# Patient Record
Sex: MALE | Race: OTHER | NOT HISPANIC OR LATINO | URBAN - METROPOLITAN AREA
[De-identification: names, ages, dates, MRNs, and addresses within clinical notes are randomized per-mention and may not be internally consistent; named-entity substitution may affect disease eponyms.]

---

## 2023-06-06 ENCOUNTER — OFFICE VISIT (OUTPATIENT)
Dept: PEDIATRICS | Facility: CLINIC | Age: 18
End: 2023-06-06
Payer: COMMERCIAL

## 2023-06-06 VITALS
DIASTOLIC BLOOD PRESSURE: 68 MMHG | BODY MASS INDEX: 29.26 KG/M2 | SYSTOLIC BLOOD PRESSURE: 110 MMHG | WEIGHT: 216 LBS | HEIGHT: 72 IN

## 2023-06-06 DIAGNOSIS — Z00.129 ENCOUNTER FOR ROUTINE CHILD HEALTH EXAMINATION WITHOUT ABNORMAL FINDINGS: Primary | ICD-10-CM

## 2023-06-06 DIAGNOSIS — Z13.220 SCREENING FOR LIPID DISORDERS: ICD-10-CM

## 2023-06-06 DIAGNOSIS — Z13.31 DEPRESSION SCREEN: ICD-10-CM

## 2023-06-06 LAB
POC HDL CHOLESTEROL: 29 MG/DL (ref 0–40)
POC LDL CHOLESTEROL: 125 MG/DL (ref 0–100)
POC NON-HDL CHOLESTEROL: 139 MG/DL (ref 0–130)
POC TOTAL CHOLESTEROL/HDL RATIO: 5.8 (ref 0–4.5)
POC TOTAL CHOLESTEROL: 168 MG/DL (ref 0–199)
POC TRIGLYCERIDES: 73 MG/DL (ref 0–150)

## 2023-06-06 PROCEDURE — 96127 BRIEF EMOTIONAL/BEHAV ASSMT: CPT | Performed by: PEDIATRICS

## 2023-06-06 PROCEDURE — 80061 LIPID PANEL: CPT | Performed by: PEDIATRICS

## 2023-06-06 PROCEDURE — 99395 PREV VISIT EST AGE 18-39: CPT | Performed by: PEDIATRICS

## 2023-06-06 RX ORDER — PANTOPRAZOLE SODIUM 40 MG/1
40 TABLET, DELAYED RELEASE ORAL
COMMUNITY
End: 2024-04-04

## 2023-06-06 RX ORDER — CLONIDINE HYDROCHLORIDE 0.1 MG/1
TABLET ORAL
COMMUNITY

## 2023-06-06 RX ORDER — MIRTAZAPINE 15 MG/1
15 TABLET, FILM COATED ORAL NIGHTLY
COMMUNITY

## 2023-06-06 NOTE — PROGRESS NOTES
"Subjective   Patient ID: Simeon Kenny is a 17 y.o. male who presents for Well Child (Here with mom/VIS given for iutd/Vision:done today/Insurance:caresource/Forms:no/Smoke/vape:+ vape/).  HPI  Interval hx - no problems  Concerns today - yes, Light spots on trunk, present for a long time- tinea versicolor?  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 Protonix - sees GI  Psychiatry q 3 months  Diet - pretty good variety of foods, +dairy, water  Drinks 12 x sprite per day to stay hydrated (!!!) - suggested propel or other low fernie electrolyte drink  Exercise - work  [place] - normal  Sleep - 5-6 hrs - recommend 8 hrs  Denies smoking, vaping, alcohol, illicit drugs  Smokes marijuana 2-3 times/week. Says he will stop end of summer and he can if he wants. Does it with friends  Interested in   Safety - wears seatbelt always; doesn't ride bike    PHQ-A Depression screen: normal, score =  0    Objective   /68   Ht 1.835 m (6' 0.25\")   Wt 98 kg (216 lb)   BMI 29.09 kg/m²     Growth percentiles:   96 %ile (Z= 1.79) based on CDC (Boys, 2-20 Years) weight-for-age data using vitals from 6/6/2023.  83 %ile (Z= 0.95) based on CDC (Boys, 2-20 Years) Stature-for-age data based on Stature recorded on 6/6/2023.   93 %ile (Z= 1.46) based on CDC (Boys, 2-20 Years) BMI-for-age based on BMI available as of 6/6/2023.    Physical Exam  Vitals reviewed.   Constitutional:       General: He is not in acute distress.     Appearance: Normal appearance.   HENT:      Right Ear: Tympanic membrane normal.      Left Ear: Tympanic membrane normal.      Nose: Nose normal. No rhinorrhea.      Mouth/Throat:      Mouth: Mucous membranes are moist.      Pharynx: Oropharynx is clear. No posterior oropharyngeal erythema.   Eyes:      Extraocular Movements: Extraocular movements intact.      Conjunctiva/sclera: Conjunctivae normal.      Pupils: Pupils are " equal, round, and reactive to light.   Cardiovascular:      Rate and Rhythm: Normal rate and regular rhythm.      Heart sounds: Normal heart sounds.   Pulmonary:      Effort: Pulmonary effort is normal.      Breath sounds: Normal breath sounds.   Abdominal:      Palpations: Abdomen is soft. There is no mass.      Tenderness: There is no abdominal tenderness.   Genitourinary:     Penis: Normal.       Testes: Normal.      Comments: No hernia appreciated  Musculoskeletal:         General: Normal range of motion.      Cervical back: Normal range of motion and neck supple.   Lymphadenopathy:      Cervical: No cervical adenopathy.   Skin:     Comments: Hypopigmented patches scattered all over trunk/back   Neurological:      General: No focal deficit present.      Mental Status: He is alert.   Psychiatric:         Mood and Affect: Mood normal.       Recent Results (from the past 504 hour(s))   POCT Lipid Panel manually resulted    Collection Time: 06/06/23 10:39 AM   Result Value Ref Range    POC Total Cholesterol 168 0 - 199 mg/dl    POC HDL Cholesterol 29 0 - 40 mg/dl    POC Triglycerides 73 0 - 150 mg/dl    POC LDL Cholesterol 125 (A) 0 - 100 mg/dl    POC Non-HDL Cholesterol 139 (A) 0 - 130 mg/dl    POC Total Cholesterol/HDL Ratio  5.8 (A) 0 - 4.5     Assessment/Plan   Diagnoses and all orders for this visit:  Encounter for routine child health examination without abnormal findings  Simeon is doing well and has a normal physical exam today.  Well child handout for age given.  Discussed importance of healthy variety in diet, regular physical exercise, adequate sleep, appropriate safety restraints in car.   Follow up for next well visit in 1 year, or sooner with any concerns.  Depression screen  Screening for lipid disorders  -     POCT Lipid Panel manually resulted

## 2023-09-07 ENCOUNTER — OFFICE VISIT (OUTPATIENT)
Dept: PEDIATRICS | Facility: CLINIC | Age: 18
End: 2023-09-07
Payer: COMMERCIAL

## 2023-09-07 ENCOUNTER — TELEPHONE (OUTPATIENT)
Dept: PEDIATRICS | Facility: CLINIC | Age: 18
End: 2023-09-07
Payer: COMMERCIAL

## 2023-09-07 DIAGNOSIS — H10.33 ACUTE BACTERIAL CONJUNCTIVITIS OF BOTH EYES: Primary | ICD-10-CM

## 2023-09-07 DIAGNOSIS — Z01.00 VISION SCREEN WITHOUT ABNORMAL FINDINGS: ICD-10-CM

## 2023-09-07 PROCEDURE — 99173 VISUAL ACUITY SCREEN: CPT | Performed by: PEDIATRICS

## 2023-09-07 PROCEDURE — 99213 OFFICE O/P EST LOW 20 MIN: CPT | Performed by: PEDIATRICS

## 2023-09-07 RX ORDER — TOBRAMYCIN 3 MG/ML
SOLUTION/ DROPS OPHTHALMIC
Qty: 5 ML | Refills: 0 | Status: SHIPPED | OUTPATIENT
Start: 2023-09-07 | End: 2023-11-16 | Stop reason: ALTCHOICE

## 2023-09-07 NOTE — TELEPHONE ENCOUNTER
Spoke to mom and Simeon and mom thinks that he has pink eye and is asking if rx could be sent to the pharmacy.  Discussed w/mom and Simeon that he would need to be seen for a rx to be sent to the pharmacy.  Parent understands plan and has no other questions.   to schedule an apt.

## 2023-09-07 NOTE — PROGRESS NOTES
Here with Mom  Woke up this morning and rubbed his eye and since then it has gotten red and it is tearing and it feels like there is something stuck under the eyelid. His vision is normal. He is otherwise well There is no fever.  There is no runny nose or cough.  There is no sore throat no ear pain.  There is no vomiting or diarrhea.  There is no problems with urination.  Alert  PERRLA EOMI conjunctiva injected L>R no foreign object seen on lid eversion  No nasal discharge  Pharynx  no redness no exudate, membranes moist  TM clear  No cervical lymphadenopathy  RRR  CTA  No rash  Vision Screening    Right eye Left eye Both eyes   Without correction 20/20 20/20    With correction          Permission obtained from patient and Mom  Fluorescein stain applied to left eye  No abrasion seen when using the lamp  Eye irrigated with sterile water afterward  Patient tolerated well    1. Acute bacterial conjunctivitis of both eyes  tobramycin (Tobrex) 0.3 % ophthalmic solution      2. Vision screen without abnormal findings [Z01.00]        Simeon has been diagnosed with pink eye He will be given an antibiotic to treat this. He will be considered not contagious 24 hours after the antibiotic is started Call if he is not better in the next 2-3 days  Return as needed

## 2023-10-20 PROBLEM — F12.11 HISTORY OF CANNABIS ABUSE: Status: ACTIVE | Noted: 2023-10-20

## 2023-10-20 PROBLEM — R10.31 RIGHT INGUINAL PAIN: Status: ACTIVE | Noted: 2023-10-20

## 2023-10-20 PROBLEM — R10.9 ABDOMINAL PAIN: Status: ACTIVE | Noted: 2023-10-20

## 2023-10-20 PROBLEM — K59.09 CHRONIC CONSTIPATION: Status: ACTIVE | Noted: 2023-10-20

## 2023-10-20 PROBLEM — R11.0 NAUSEA IN CHILD: Status: ACTIVE | Noted: 2023-10-20

## 2023-10-20 PROBLEM — G44.319 ACUTE POST-TRAUMATIC HEADACHE, NOT INTRACTABLE: Status: ACTIVE | Noted: 2023-10-20

## 2023-10-20 PROBLEM — F45.21 HYPOCHONDRIASIS: Status: ACTIVE | Noted: 2021-05-28

## 2023-10-20 PROBLEM — S09.90XA INJURY OF HEAD: Status: ACTIVE | Noted: 2023-10-20

## 2023-10-20 PROBLEM — E55.9 VITAMIN D DEFICIENCY: Status: ACTIVE | Noted: 2023-10-20

## 2023-10-20 PROBLEM — R51.9 ACUTE INTRACTABLE HEADACHE: Status: ACTIVE | Noted: 2023-10-20

## 2023-10-20 PROBLEM — F84.0 AUTISTIC DISORDER (HHS-HCC): Status: ACTIVE | Noted: 2021-05-28

## 2023-10-20 PROBLEM — T24.009A: Status: ACTIVE | Noted: 2023-10-20

## 2023-10-20 PROBLEM — F41.0 PANIC ATTACK: Status: ACTIVE | Noted: 2023-10-20

## 2023-10-20 PROBLEM — Z86.16 HISTORY OF COVID-19: Status: ACTIVE | Noted: 2023-10-20

## 2023-10-20 PROBLEM — S00.03XA CONTUSION OF SCALP: Status: ACTIVE | Noted: 2023-10-20

## 2023-10-20 PROBLEM — F40.01 AGORAPHOBIA WITH PANIC DISORDER: Status: ACTIVE | Noted: 2021-05-28

## 2023-10-20 PROBLEM — L03.031 ACUTE PARONYCHIA OF TOE OF RIGHT FOOT: Status: ACTIVE | Noted: 2023-10-20

## 2023-10-20 PROBLEM — F90.2 ATTENTION DEFICIT HYPERACTIVITY DISORDER (ADHD), COMBINED TYPE: Status: ACTIVE | Noted: 2023-10-20

## 2023-10-20 PROBLEM — H53.149 PHOTOPHOBIA: Status: ACTIVE | Noted: 2023-10-20

## 2023-10-20 PROBLEM — K21.9 GERD WITHOUT ESOPHAGITIS: Status: ACTIVE | Noted: 2023-10-20

## 2023-10-20 PROBLEM — L60.0 ONYCHOCRYPTOSIS: Status: ACTIVE | Noted: 2023-10-20

## 2023-10-20 PROBLEM — L23.9 ALLERGIC CONTACT DERMATITIS: Status: ACTIVE | Noted: 2023-10-20

## 2023-10-20 PROBLEM — S93.432A SPRAIN OF TIBIOFIBULAR LIGAMENT OF LEFT ANKLE: Status: ACTIVE | Noted: 2023-10-20

## 2023-10-20 PROBLEM — R12 HEARTBURN: Status: ACTIVE | Noted: 2023-10-20
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
## 2023-10-20 PROBLEM — S62.621A FRACTURE OF MIDDLE PHALANX OF LEFT INDEX FINGER: Status: ACTIVE | Noted: 2023-10-20

## 2023-10-20 PROBLEM — F40.298 PHONOPHOBIA: Status: ACTIVE | Noted: 2023-10-20

## 2023-10-20 PROBLEM — S69.90XA INJURY OF FINGER: Status: ACTIVE | Noted: 2023-10-20

## 2023-10-20 PROBLEM — S06.0X0A CONCUSSION WITH NO LOSS OF CONSCIOUSNESS: Status: ACTIVE | Noted: 2023-10-20

## 2023-10-20 PROBLEM — S99.919A INJURY OF ANKLE: Status: ACTIVE | Noted: 2023-10-20

## 2023-10-20 PROBLEM — G47.00 INSOMNIA, UNSPECIFIED: Status: ACTIVE | Noted: 2021-05-28

## 2023-10-20 PROBLEM — F41.9 ANXIETY: Status: ACTIVE | Noted: 2023-10-20

## 2023-10-20 PROBLEM — J02.9 SORE THROAT: Status: ACTIVE | Noted: 2023-10-20

## 2023-10-20 RX ORDER — SUCRALFATE 1 G/1
TABLET ORAL
COMMUNITY
End: 2023-11-16 | Stop reason: ALTCHOICE

## 2023-10-20 RX ORDER — DOCUSATE SODIUM 100 MG/1
100-400 CAPSULE, LIQUID FILLED ORAL DAILY PRN
COMMUNITY
End: 2023-11-16 | Stop reason: ALTCHOICE

## 2023-10-20 RX ORDER — LEVOCETIRIZINE DIHYDROCHLORIDE 5 MG/1
TABLET, FILM COATED ORAL
COMMUNITY
End: 2023-11-16 | Stop reason: ALTCHOICE

## 2023-11-11 ENCOUNTER — TELEPHONE (OUTPATIENT)
Dept: PEDIATRICS | Facility: CLINIC | Age: 18
End: 2023-11-11
Payer: COMMERCIAL

## 2023-11-11 NOTE — TELEPHONE ENCOUNTER
Mom informed that Dr. Fried typically does not prescribe Zofran in an outpatient setting.  Offered appt, but she declined.

## 2023-11-16 ENCOUNTER — OFFICE VISIT (OUTPATIENT)
Dept: PEDIATRICS | Facility: CLINIC | Age: 18
End: 2023-11-16
Payer: COMMERCIAL

## 2023-11-16 VITALS
HEIGHT: 72 IN | HEART RATE: 82 BPM | DIASTOLIC BLOOD PRESSURE: 78 MMHG | SYSTOLIC BLOOD PRESSURE: 120 MMHG | BODY MASS INDEX: 26.14 KG/M2 | WEIGHT: 193 LBS | OXYGEN SATURATION: 98 %

## 2023-11-16 DIAGNOSIS — F41.1 GENERALIZED ANXIETY DISORDER: Primary | ICD-10-CM

## 2023-11-16 PROCEDURE — 99213 OFFICE O/P EST LOW 20 MIN: CPT | Performed by: PEDIATRICS

## 2023-11-16 RX ORDER — ONDANSETRON 4 MG/1
TABLET, FILM COATED ORAL
COMMUNITY
Start: 2023-11-14 | End: 2024-02-22 | Stop reason: ALTCHOICE

## 2023-11-16 RX ORDER — BUSPIRONE HYDROCHLORIDE 10 MG/1
10 TABLET ORAL 2 TIMES DAILY
COMMUNITY
Start: 2023-11-14 | End: 2024-02-22 | Stop reason: ALTCHOICE

## 2023-11-16 ASSESSMENT — ENCOUNTER SYMPTOMS
CONSTIPATION: 0
SHORTNESS OF BREATH: 1
NUMBNESS: 0
COUGH: 0
HEADACHES: 1
DIARRHEA: 0
ABDOMINAL DISTENTION: 0
CHILLS: 0
MYALGIAS: 0
NAUSEA: 1
DIZZINESS: 1
FATIGUE: 0
ACTIVITY CHANGE: 0
WHEEZING: 0
VOMITING: 1
WEAKNESS: 0
LIGHT-HEADEDNESS: 0
APPETITE CHANGE: 0
BACK PAIN: 1
STRIDOR: 0
FEVER: 0
NERVOUS/ANXIOUS: 1

## 2023-11-16 NOTE — PROGRESS NOTES
Subjective   Patient ID: Simeon Kenny is a 18 y.o. male here with mom.     HPI:  18 year old male here with with increased anxiety. Patient has anxiety about taking Buspar, mirtazapine and clonidine together. He had recently been seen at [facility] for an emergency nurse visit and that nurse communicated to a psychiatrist about patient's symptoms and the psychiatrist recommended adding Buspar to his medication regimen. Patient is worried that taking these medications all together will decrease his blood pressure so he has not taken the Buspar yet. Patient can't get in touch with his psychiatrist regarding his anxiety and concerns about taking this combination of medication. Patient now having somatic symptoms associated with anxiety, such as shortness of breath, nausea, vomiting and chest pain. Patient has never seen a therapist has only been prescribed medications and on these medications for the past years.     Review of Systems   Constitutional:  Negative for activity change, appetite change, chills, fatigue and fever.   Respiratory:  Positive for shortness of breath. Negative for cough, wheezing and stridor.    Cardiovascular:  Positive for chest pain.   Gastrointestinal:  Positive for nausea and vomiting. Negative for abdominal distention, constipation and diarrhea.   Genitourinary:  Negative for decreased urine volume.   Musculoskeletal:  Positive for back pain. Negative for myalgias.   Skin:  Negative for rash.   Neurological:  Positive for dizziness and headaches. Negative for weakness, light-headedness and numbness.   Psychiatric/Behavioral:  Negative for self-injury. The patient is nervous/anxious.        Objective   Vitals:    11/16/23 1518   BP: 120/78   Pulse: 82   SpO2: 98%      Physical Exam  Constitutional:       Appearance: Normal appearance.      Comments: Patient appears anxious on exam.   HENT:      Head: Normocephalic and atraumatic.      Mouth/Throat:      Mouth: Mucous membranes  are moist.   Cardiovascular:      Rate and Rhythm: Normal rate and regular rhythm.      Heart sounds: Normal heart sounds. No murmur heard.     No friction rub. No gallop.   Pulmonary:      Effort: Pulmonary effort is normal. No respiratory distress.      Breath sounds: Normal breath sounds. No stridor. No wheezing, rhonchi or rales.   Lymphadenopathy:      Cervical: No cervical adenopathy.   Skin:     General: Skin is warm and dry.   Neurological:      General: No focal deficit present.      Mental Status: He is alert.   Psychiatric:         Mood and Affect: Mood normal.      Comments: Patient is anxious but able to redirect.         Assessment/Plan   18 year old male here with diagnosis of generalized anxiety disorder, presenting with anxiety about medications patient's psychiatrist is recommending. I discussed with patient that I would defer this combination of medications and side effects to his psychiatrist as this is not my field of expertise. I did inform him that I would think is psychiatrist is aware of the side effects and safety of taking these medications and that she would not have prescribed if she thought this could effect his blood pressure. Patient appeared reassured after we talked. I also discussed importance of breathing exercises to help with his anxiety. Patient not seeing psychology in addition to medications with psychiatry, will refer to psychology to establish CBT/therapy treatment as well as this would be beneficial to patient's treatment of anxiety. Patient denies any SI or HI and appeared more relaxed after talked. He is overall well appearing and clinically stable.     Generalized anxiety disorder  A referral to psychology was made in the office today, please call and schedule this appointment as soon as available. If you have difficulties scheduling this appointment please contact our office for further assistance  Remember to practice breathing exercises as discussed in the office  today  Please contact your child's psychiatrist with any concerns you may have regarding his anxiety medication regimen at his next appointment.   -     Referral to Pediatric Psychology; Future    Feel free to contact our office if any new questions or concerns arise.

## 2023-11-20 ENCOUNTER — TELEPHONE (OUTPATIENT)
Dept: PEDIATRICS | Facility: CLINIC | Age: 18
End: 2023-11-20
Payer: COMMERCIAL

## 2023-11-20 NOTE — TELEPHONE ENCOUNTER
Mom and Simeon called b/c every time Simeon stands up his vision goes black and his stomach is hurting right below his ribs.  He saw  on 11/16/23.  He's been having bad anxiety and has been taking 0.2mg clonidine once per day but his psychiatrist at [facility] said he can take 2 pills.  So he's been taking 2 and now he's having these new symptoms.  Mom said this wasn't happening when he saw  and said that his blood pressure was normal when seen. Mom also said that [facility] said his symptoms could be b/c of anxiety.   Recommended call [facility] and that he should go to the ED b/c he could be dehydrated and blacking out when standing up isn't normal and since we can't do an EKG here again encouraged an ED visit.  Parent understands plan and has no other questions.

## 2023-11-28 ENCOUNTER — OFFICE VISIT (OUTPATIENT)
Dept: PEDIATRICS | Facility: CLINIC | Age: 18
End: 2023-11-28
Payer: COMMERCIAL

## 2023-11-28 DIAGNOSIS — F41.9 ANXIETY: ICD-10-CM

## 2023-11-28 DIAGNOSIS — K59.00 CONSTIPATION, UNSPECIFIED CONSTIPATION TYPE: ICD-10-CM

## 2023-11-28 DIAGNOSIS — R11.2 NAUSEA AND VOMITING, UNSPECIFIED VOMITING TYPE: Primary | ICD-10-CM

## 2023-11-28 DIAGNOSIS — Z09 FOLLOW-UP EXAM: ICD-10-CM

## 2023-11-28 PROCEDURE — 99214 OFFICE O/P EST MOD 30 MIN: CPT | Performed by: PEDIATRICS

## 2023-11-28 NOTE — PROGRESS NOTES
"Subjective   Patient ID: Simeon Kenny is a 18 y.o. male who presents for f/u dehydration (Here with mom.).  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   Physical Exam  Constitutional:       General: He is not in acute distress.  HENT:      Right Ear: Tympanic membrane normal.      Left Ear: Tympanic membrane normal.      Nose: Nose normal.      Mouth/Throat:      Mouth: Mucous membranes are moist.      Pharynx: Oropharynx is clear. No oropharyngeal exudate or posterior oropharyngeal erythema.   Eyes:      Conjunctiva/sclera: Conjunctivae normal.   Cardiovascular:      Rate and Rhythm: Normal rate and regular rhythm.      Heart sounds: Normal heart sounds.   Pulmonary:      Effort: Pulmonary effort is normal.      Breath sounds: Normal breath sounds.   Musculoskeletal:      Cervical back: Neck supple.   Lymphadenopathy:      Cervical: No cervical adenopathy.   Skin:     Findings: No lesion or rash.   Neurological:      Mental Status: He is alert.       Assessment/Plan   Diagnoses and all orders for this visit:  Nausea and vomiting, unspecified vomiting type  Call GI about persistent symptoms to discuss medication and further evaluation.  For now, try eating small amounts more frequently.    Constipation, unspecified constipation " type  Recommend miralax daily to maintain regular soft stools.  Encourage lots of water/fluids.  Anxiety  Strongly recommended revisit counseling for anxiety, as it is significantly impacting his daily life.  Continued follow up with psychiatry.

## 2023-11-29 ENCOUNTER — TELEPHONE (OUTPATIENT)
Dept: PEDIATRIC GASTROENTEROLOGY | Facility: HOSPITAL | Age: 18
End: 2023-11-29
Payer: COMMERCIAL

## 2023-11-30 DIAGNOSIS — R10.9 ABDOMINAL PAIN, UNSPECIFIED ABDOMINAL LOCATION: ICD-10-CM

## 2023-11-30 DIAGNOSIS — R11.0 NAUSEA IN CHILD: ICD-10-CM

## 2023-11-30 DIAGNOSIS — K21.9 GERD WITHOUT ESOPHAGITIS: ICD-10-CM

## 2023-11-30 NOTE — TELEPHONE ENCOUNTER
Spoke with Simeon and discussed recommendations of pH impedance study and gastric emptying study. After discussing tests, he feels that he may have an issue with delayed gastric emptying. Recommended scheduling this test first and if abnormal may not need pH impedance Ordered tests and provided radiology scheduling number for gastric emptying. Advised that endoscopy will call to schedule pH impedance.

## 2023-12-06 ENCOUNTER — TELEPHONE (OUTPATIENT)
Dept: PEDIATRIC GASTROENTEROLOGY | Facility: HOSPITAL | Age: 18
End: 2023-12-06
Payer: COMMERCIAL

## 2023-12-07 DIAGNOSIS — R10.9 ABDOMINAL PAIN, UNSPECIFIED ABDOMINAL LOCATION: Primary | ICD-10-CM

## 2024-01-23 ENCOUNTER — TELEPHONE (OUTPATIENT)
Dept: PEDIATRIC GASTROENTEROLOGY | Facility: HOSPITAL | Age: 19
End: 2024-01-23
Payer: COMMERCIAL

## 2024-01-24 ENCOUNTER — TELEPHONE (OUTPATIENT)
Dept: PEDIATRIC GASTROENTEROLOGY | Facility: HOSPITAL | Age: 19
End: 2024-01-24
Payer: COMMERCIAL

## 2024-02-22 ENCOUNTER — OFFICE VISIT (OUTPATIENT)
Dept: PEDIATRICS | Facility: CLINIC | Age: 19
End: 2024-02-22
Payer: COMMERCIAL

## 2024-02-22 VITALS
DIASTOLIC BLOOD PRESSURE: 76 MMHG | SYSTOLIC BLOOD PRESSURE: 120 MMHG | TEMPERATURE: 101.2 F | BODY MASS INDEX: 27.43 KG/M2 | WEIGHT: 207 LBS | HEIGHT: 73 IN

## 2024-02-22 DIAGNOSIS — R05.9 COUGH, UNSPECIFIED TYPE: ICD-10-CM

## 2024-02-22 DIAGNOSIS — R09.81 NASAL CONGESTION: Primary | ICD-10-CM

## 2024-02-22 DIAGNOSIS — Z13.9 SCREENING FOR CONDITION: ICD-10-CM

## 2024-02-22 PROCEDURE — 99213 OFFICE O/P EST LOW 20 MIN: CPT | Performed by: PEDIATRICS

## 2024-02-22 PROCEDURE — 87637 SARSCOV2&INF A&B&RSV AMP PRB: CPT

## 2024-02-22 RX ORDER — IBUPROFEN 800 MG/1
TABLET ORAL
COMMUNITY
Start: 2023-12-11 | End: 2024-02-22 | Stop reason: WASHOUT

## 2024-02-22 RX ORDER — MIRTAZAPINE 7.5 MG/1
TABLET, FILM COATED ORAL
COMMUNITY
Start: 2024-01-14

## 2024-02-22 ASSESSMENT — ENCOUNTER SYMPTOMS
CHILLS: 1
ACTIVITY CHANGE: 1
DIZZINESS: 1
SORE THROAT: 0
NECK STIFFNESS: 0
WEAKNESS: 0
WHEEZING: 0
HEADACHES: 1
FEVER: 1
STRIDOR: 0
APPETITE CHANGE: 1
FATIGUE: 1
NAUSEA: 0
MYALGIAS: 1
LIGHT-HEADEDNESS: 1
ABDOMINAL DISTENTION: 0
COUGH: 1
VOMITING: 0
NUMBNESS: 0
DIARRHEA: 0
RHINORRHEA: 1
SHORTNESS OF BREATH: 0

## 2024-02-22 NOTE — PROGRESS NOTES
"Subjective   Patient ID: Simeon Kenny is a 18 y.o. male here alone.    HPI  18 year old male here with headache, fever, myalgia and \"burning eye\" x 3 No change in vision, painful eye movements, itching eyes or eye discharge associated. Positive rhinorrhea and cough x 3 days. No vomiting, no diarrhea, no sore throat. No change in liquid intake, positive decreased appetite. No change in urine output. No change in vision, no neck stiffness, sometimes patient feels light headed with headache but no light headedness at present.     Review of Systems   Constitutional:  Positive for activity change, appetite change, chills, fatigue and fever.   HENT:  Positive for congestion and rhinorrhea. Negative for sore throat.    Respiratory:  Positive for cough. Negative for shortness of breath, wheezing and stridor.    Gastrointestinal:  Negative for abdominal distention, diarrhea, nausea and vomiting.   Genitourinary:  Negative for decreased urine volume.   Musculoskeletal:  Positive for myalgias. Negative for neck stiffness.   Skin:  Negative for rash.   Neurological:  Positive for dizziness, light-headedness and headaches. Negative for weakness and numbness.       Objective   Vitals:    02/22/24 1107   BP: 120/76   Temp: 38.4 °C (101.2 °F)      Physical Exam  Constitutional:       Appearance: Normal appearance.   HENT:      Head: Normocephalic and atraumatic.      Comments: No maxillary or frontal sinus tenderness upon palpation.      Right Ear: Tympanic membrane, ear canal and external ear normal.      Left Ear: Tympanic membrane, ear canal and external ear normal.      Nose: Congestion and rhinorrhea present.      Mouth/Throat:      Mouth: Mucous membranes are moist.      Pharynx: Oropharynx is clear. No oropharyngeal exudate or posterior oropharyngeal erythema.   Eyes:      Extraocular Movements: Extraocular movements intact.      Conjunctiva/sclera: Conjunctivae normal.      Pupils: Pupils are equal, round, and " reactive to light.   Cardiovascular:      Rate and Rhythm: Normal rate and regular rhythm.      Heart sounds: Normal heart sounds. No murmur heard.     No friction rub. No gallop.   Pulmonary:      Effort: Pulmonary effort is normal. No respiratory distress.      Breath sounds: Normal breath sounds. No stridor. No wheezing, rhonchi or rales.   Abdominal:      General: Abdomen is flat. Bowel sounds are normal. There is no distension.      Palpations: Abdomen is soft.      Tenderness: There is no abdominal tenderness.   Lymphadenopathy:      Cervical: No cervical adenopathy.   Neurological:      General: No focal deficit present.      Mental Status: He is alert.      Cranial Nerves: No cranial nerve deficit.      Motor: No weakness.      Gait: Gait normal.         Assessment/Plan   18 year old male here with three days of fever, myalgias, cough and nasal congestion with headaches. Reassuring lung, otoscopic and neurological exam. Headaches likely due to referred pain from nasal congestion. History and physical consistent with viral upper respiratory infection. Will send viral nasal testing per parental request today. He is overall well hydrated, in no respiratory distress and clinically stable.     Nasal congestion/Cough, unspecified type  1. use ayr nasal saline/little remedies nasal saline twice a day as needed for nasal congestion  2. encourage oral liquid intake  3. Drink decaf tea with honey as needed for cough   4. use humidifier as needed for nasal congestion  5. A viral nasal swab for covid, flu and rsv were sent in the office today. Please stay home while your are waiting these results. The results should be back in 24-48 hours. The office will call you for positive results only. Regardless of the results, you should stay home until fever is resolved and you are no longer taking fever reducing medications for 24 hours.  -     RSV PCR; Future- PENDING  -     Sars-CoV-2 and Influenza A/B PCR;  Future-PENDING    Feel free to contact our office if any new questions or concerns arise.          Divya Fried MD 02/22/24 11:02 AM

## 2024-02-23 ENCOUNTER — TELEPHONE (OUTPATIENT)
Dept: PEDIATRICS | Facility: CLINIC | Age: 19
End: 2024-02-23
Payer: COMMERCIAL

## 2024-02-23 LAB
FLUAV RNA RESP QL NAA+PROBE: DETECTED
FLUBV RNA RESP QL NAA+PROBE: NOT DETECTED
RSV RNA RESP QL NAA+PROBE: NOT DETECTED
SARS-COV-2 RNA RESP QL NAA+PROBE: NOT DETECTED

## 2024-02-23 NOTE — TELEPHONE ENCOUNTER
Positive flu A  Consent in chart to discuss with mom  Called mom gave results of tests unable to get on mychart need to reset password.  Discussed sx care, mom asking incubation period of influenza told mom I do not know I could look it up, mom said she could.  No further questions or concerns. To call as needed

## 2024-02-24 ENCOUNTER — TELEPHONE (OUTPATIENT)
Dept: PEDIATRICS | Facility: CLINIC | Age: 19
End: 2024-02-24

## 2024-02-24 ENCOUNTER — APPOINTMENT (OUTPATIENT)
Dept: PEDIATRICS | Facility: CLINIC | Age: 19
End: 2024-02-24
Payer: COMMERCIAL

## 2024-02-26 ENCOUNTER — APPOINTMENT (OUTPATIENT)
Dept: PEDIATRICS | Facility: CLINIC | Age: 19
End: 2024-02-26
Payer: COMMERCIAL

## 2024-04-04 DIAGNOSIS — R12 HEARTBURN: ICD-10-CM

## 2024-04-04 RX ORDER — PANTOPRAZOLE SODIUM 40 MG/1
40 TABLET, DELAYED RELEASE ORAL
Qty: 180 TABLET | Refills: 2 | Status: SHIPPED | OUTPATIENT
Start: 2024-04-04

## 2025-02-11 DIAGNOSIS — R12 HEARTBURN: ICD-10-CM

## 2025-02-12 RX ORDER — PANTOPRAZOLE SODIUM 40 MG/1
40 TABLET, DELAYED RELEASE ORAL
Qty: 180 TABLET | Refills: 1 | Status: SHIPPED | OUTPATIENT
Start: 2025-02-12